# Patient Record
Sex: MALE | Race: WHITE | Employment: FULL TIME | URBAN - METROPOLITAN AREA
[De-identification: names, ages, dates, MRNs, and addresses within clinical notes are randomized per-mention and may not be internally consistent; named-entity substitution may affect disease eponyms.]

---

## 2019-02-25 ENCOUNTER — OFFICE VISIT (OUTPATIENT)
Dept: ORTHOPEDIC SURGERY | Age: 39
End: 2019-02-25
Payer: COMMERCIAL

## 2019-02-25 VITALS
RESPIRATION RATE: 12 BRPM | SYSTOLIC BLOOD PRESSURE: 128 MMHG | DIASTOLIC BLOOD PRESSURE: 72 MMHG | HEIGHT: 72 IN | WEIGHT: 204 LBS | BODY MASS INDEX: 27.63 KG/M2

## 2019-02-25 DIAGNOSIS — S93.491A HIGH ANKLE SPRAIN OF RIGHT LOWER EXTREMITY, INITIAL ENCOUNTER: Primary | ICD-10-CM

## 2019-02-25 PROCEDURE — 99203 OFFICE O/P NEW LOW 30 MIN: CPT | Performed by: ORTHOPAEDIC SURGERY

## 2019-02-25 PROCEDURE — L4361 PNEUMA/VAC WALK BOOT PRE OTS: HCPCS | Performed by: ORTHOPAEDIC SURGERY

## 2019-02-25 RX ORDER — NAPROXEN 500 MG/1
500 TABLET ORAL 2 TIMES DAILY WITH MEALS
Qty: 60 TABLET | Refills: 2 | Status: SHIPPED | OUTPATIENT
Start: 2019-02-25 | End: 2022-02-28

## 2019-02-25 ASSESSMENT — ENCOUNTER SYMPTOMS
ALLERGIC/IMMUNOLOGIC NEGATIVE: 1
GASTROINTESTINAL NEGATIVE: 1
EYES NEGATIVE: 1
RESPIRATORY NEGATIVE: 1

## 2019-02-27 ENCOUNTER — TELEPHONE (OUTPATIENT)
Dept: ORTHOPEDIC SURGERY | Age: 39
End: 2019-02-27

## 2019-03-04 ENCOUNTER — HOSPITAL ENCOUNTER (OUTPATIENT)
Dept: PHYSICAL THERAPY | Age: 39
Setting detail: THERAPIES SERIES
Discharge: HOME | End: 2019-03-04
Payer: COMMERCIAL

## 2019-03-04 PROCEDURE — 97110 THERAPEUTIC EXERCISES: CPT | Performed by: PHYSICAL THERAPIST

## 2019-03-04 PROCEDURE — 97161 PT EVAL LOW COMPLEX 20 MIN: CPT | Performed by: PHYSICAL THERAPIST

## 2019-03-04 PROCEDURE — G8979 MOBILITY GOAL STATUS: HCPCS | Performed by: PHYSICAL THERAPIST

## 2019-03-04 PROCEDURE — G8978 MOBILITY CURRENT STATUS: HCPCS | Performed by: PHYSICAL THERAPIST

## 2019-03-04 PROCEDURE — 97140 MANUAL THERAPY 1/> REGIONS: CPT | Performed by: PHYSICAL THERAPIST

## 2019-03-11 ENCOUNTER — HOSPITAL ENCOUNTER (OUTPATIENT)
Dept: PHYSICAL THERAPY | Age: 39
Setting detail: THERAPIES SERIES
Discharge: HOME | End: 2019-03-11
Payer: COMMERCIAL

## 2019-03-11 ENCOUNTER — OFFICE VISIT (OUTPATIENT)
Dept: ORTHOPEDIC SURGERY | Age: 39
End: 2019-03-11
Payer: COMMERCIAL

## 2019-03-11 VITALS
HEIGHT: 72 IN | RESPIRATION RATE: 12 BRPM | BODY MASS INDEX: 27.63 KG/M2 | SYSTOLIC BLOOD PRESSURE: 117 MMHG | HEART RATE: 65 BPM | WEIGHT: 204 LBS | DIASTOLIC BLOOD PRESSURE: 69 MMHG

## 2019-03-11 DIAGNOSIS — S93.491D HIGH ANKLE SPRAIN OF RIGHT LOWER EXTREMITY, SUBSEQUENT ENCOUNTER: Primary | ICD-10-CM

## 2019-03-11 PROCEDURE — 97112 NEUROMUSCULAR REEDUCATION: CPT | Performed by: PHYSICAL THERAPIST

## 2019-03-11 PROCEDURE — 97110 THERAPEUTIC EXERCISES: CPT | Performed by: PHYSICAL THERAPIST

## 2019-03-11 PROCEDURE — 99212 OFFICE O/P EST SF 10 MIN: CPT | Performed by: ORTHOPAEDIC SURGERY

## 2019-03-11 PROCEDURE — 97530 THERAPEUTIC ACTIVITIES: CPT | Performed by: PHYSICAL THERAPIST

## 2019-03-19 ENCOUNTER — HOSPITAL ENCOUNTER (OUTPATIENT)
Dept: PHYSICAL THERAPY | Age: 39
Setting detail: THERAPIES SERIES
Discharge: HOME | End: 2019-03-19
Payer: COMMERCIAL

## 2019-03-19 PROCEDURE — 97110 THERAPEUTIC EXERCISES: CPT | Performed by: PHYSICAL THERAPIST

## 2019-03-19 PROCEDURE — 97112 NEUROMUSCULAR REEDUCATION: CPT | Performed by: PHYSICAL THERAPIST

## 2019-03-26 ENCOUNTER — HOSPITAL ENCOUNTER (OUTPATIENT)
Dept: PHYSICAL THERAPY | Age: 39
Setting detail: THERAPIES SERIES
Discharge: HOME | End: 2019-03-26
Payer: COMMERCIAL

## 2019-03-26 PROCEDURE — 97110 THERAPEUTIC EXERCISES: CPT | Performed by: PHYSICAL THERAPIST

## 2019-03-26 PROCEDURE — 97112 NEUROMUSCULAR REEDUCATION: CPT | Performed by: PHYSICAL THERAPIST

## 2022-02-28 ENCOUNTER — OFFICE VISIT (OUTPATIENT)
Dept: ORTHOPEDIC SURGERY | Age: 42
End: 2022-02-28
Payer: COMMERCIAL

## 2022-02-28 VITALS — HEIGHT: 71 IN | RESPIRATION RATE: 12 BRPM | WEIGHT: 207 LBS | BODY MASS INDEX: 28.98 KG/M2

## 2022-02-28 DIAGNOSIS — M25.512 CHRONIC LEFT SHOULDER PAIN: Primary | ICD-10-CM

## 2022-02-28 DIAGNOSIS — M75.22 BICEPS TENDINITIS OF LEFT SHOULDER: ICD-10-CM

## 2022-02-28 DIAGNOSIS — G89.29 CHRONIC LEFT SHOULDER PAIN: Primary | ICD-10-CM

## 2022-02-28 PROCEDURE — 99214 OFFICE O/P EST MOD 30 MIN: CPT | Performed by: ORTHOPAEDIC SURGERY

## 2022-02-28 PROCEDURE — 20610 DRAIN/INJ JOINT/BURSA W/O US: CPT | Performed by: ORTHOPAEDIC SURGERY

## 2022-02-28 RX ORDER — OLMESARTAN MEDOXOMIL 20 MG/1
TABLET ORAL
COMMUNITY
Start: 2022-02-05

## 2022-02-28 RX ORDER — METHYLPREDNISOLONE ACETATE 40 MG/ML
80 INJECTION, SUSPENSION INTRA-ARTICULAR; INTRALESIONAL; INTRAMUSCULAR; SOFT TISSUE ONCE
Status: COMPLETED | OUTPATIENT
Start: 2022-02-28 | End: 2022-02-28

## 2022-02-28 RX ORDER — LIDOCAINE HYDROCHLORIDE 10 MG/ML
4 INJECTION, SOLUTION INFILTRATION; PERINEURAL ONCE
Status: COMPLETED | OUTPATIENT
Start: 2022-02-28 | End: 2022-02-28

## 2022-02-28 RX ORDER — HYDROCHLOROTHIAZIDE 12.5 MG/1
CAPSULE, GELATIN COATED ORAL
COMMUNITY
Start: 2022-02-05

## 2022-02-28 RX ADMIN — METHYLPREDNISOLONE ACETATE 80 MG: 40 INJECTION, SUSPENSION INTRA-ARTICULAR; INTRALESIONAL; INTRAMUSCULAR; SOFT TISSUE at 09:40

## 2022-02-28 RX ADMIN — LIDOCAINE HYDROCHLORIDE 4 ML: 10 INJECTION, SOLUTION INFILTRATION; PERINEURAL at 09:40

## 2022-02-28 NOTE — PROGRESS NOTES
Pili Flaherty is seen today for chronic left shoulder pain. He first remembers injuring his shoulder playing an alumni softball game about 8 years ago. He now notices discomfort in the anterior shoulder typically associated with softball and golf. Pain is about 4 out of 10. He has pain with raising his arm, getting dressed, and using it. He uses intermittent ibuprofen or ice but has not had consistent treatment. He is hoping to have some resolution before he plays softball again this spring. He is a banker. He has high cholesterol and high blood pressure but is otherwise healthy. History: Patient's relevant past family, medical, and social history are reviewed as part of today's visit. ROS of pertinent positives and negatives as above; otherwise negative. General Exam:    Vitals: Resp. rate 12, height 5' 11\" (1.803 m), weight 207 lb (93.9 kg). Constitutional: Patient is adequately groomed with no evidence of malnutrition  Mental Status: The patient is oriented to time, place and person. The patient's mood and affect are appropriate. Gait:  Patient walks with normal gait and station. Lymphatic: The lymphatic examination bilaterally reveals all areas to be without enlargement or induration. Vascular: Examination reveals no swelling or calf tenderness. Peripheral pulses are palpable and 2+. Neurological: The patient has good coordination. There is no weakness or sensory deficit. Skin:    Head/Neck: inspection reveals no rashes, ulcerations or lesions. Trunk:  inspection reveals no rashes, ulcerations or lesions. Right Lower Extremity: inspection reveals no rashes, ulcerations or lesions. Left Lower Extremity: inspection reveals no rashes, ulcerations or lesions. Examination of the cervical spine reveals no restriction in motion. There are no reproduction of symptoms into either arm with flexion, extension, rotation or palpation.   The patient has a negative Spurling sign, and no tenderness. Examination of the right shoulder reveals normal scapular control and no prominence. There is no pain over the acromioclavicular or sternoclavicular joints. The patient has no biceps pain. There is full range of motion. There is no pain with impingement testing. There is no pain with Rao maneuver. Coke's maneuver is normal.  There is no pain or apprehension in the abducted externally rotated position. There is no sulcus sign. There is no instability with anterior or posterior stress applied. The patient demonstrates full strength in the supraspinatus, infraspinatus, and subscapularis. Neurologic and vascular examination of the upper extremity  is normal.    Left shoulder has tenderness anteriorly. He has significant pain with forward flexion and supination but not with forward flexion pronation. He has pain with Rao and impingement maneuvers full strength in the cuff. Neurologic and vascular exam to the left upper extremity are normal and I cannot elicit instability. He has moderate to significant pain with Coke's maneuver. Xrays of the left shoulder were obtained today in the office and interpreted by me. AP in the scapular plane, axillary lateral, and scapular Y. These demonstrate:  No bony abnormalities. Assessment: Left shoulder bicep tendinitis with possible SLAP tear. Plan: Today we will proceed with intra-articular cortisone injection followed by therapy with follow-up in 3 weeks. We discussed the risk, benefits, alternatives to the injection. He understands there is a small risk of bicep tendon rupture. He agrees with this plan. Follow-up with me in 3 weeks. Procedure: Under sterile technique, left shoulder is injected anteriorly into the glenohumeral space with 80 mg of Depo-Medrol and 40 mg of lidocaine. He tolerated that well.

## 2022-03-07 ENCOUNTER — HOSPITAL ENCOUNTER (OUTPATIENT)
Dept: PHYSICAL THERAPY | Age: 42
Setting detail: THERAPIES SERIES
Discharge: HOME | End: 2022-03-07
Payer: COMMERCIAL

## 2022-03-07 PROCEDURE — 97112 NEUROMUSCULAR REEDUCATION: CPT | Performed by: PHYSICAL THERAPIST

## 2022-03-07 PROCEDURE — 97161 PT EVAL LOW COMPLEX 20 MIN: CPT | Performed by: PHYSICAL THERAPIST

## 2022-03-07 PROCEDURE — 97110 THERAPEUTIC EXERCISES: CPT | Performed by: PHYSICAL THERAPIST

## 2022-03-07 NOTE — FLOWSHEET NOTE
[address] [place] [address] [facility], [place]                                                         Physical Therapy Daily Treatment Note  Date:  3/7/2022    Patient Name:  Cruz Torre    :  1980  MRN: [id number]    Medical/Treatment Diagnosis Information:  · Diagnosis: M75.22 (ICD-10-CM) - Biceps tendinitis of left shoulder  · Treatment Diagnosis: M25.511 (R) shoulder pain; M62.81 muscle weakness  Insurance/Certification information:  PT Insurance Information: [employer]- Darra Poot thru [employer]  Physician Information:  Referring Practitioner: Fannie Pa MD  Has the plan of care been signed (Y/N):        []  Yes  [x]  No     Date of Patient follow up with Physician: 3/21/22      Is this a Progress Report:     []  Yes  [x]  No        If Yes:  Date Range for reporting period:  Beginning- 3/7/2022   Ending    Progress report will be due (10 Rx or 30 days whichever is less): 10 visits or 29       Recertification will be due (POC Duration  / 90 days whichever is less): as above         Visit # Insurance Allowable Auth Required   1 [place], auth thru [employer] [x]  Yes []  No        Functional Scale: UEFI - 85%    Date assessed:  3/7/2022     Latex Allergy:  [x]NO      []YES  Preferred Language for Healthcare:   [x]English       []other:      Pain level:  1/10  on 3/7/2022    SUBJECTIVE:  See eval    OBJECTIVE: See eval   Observation:    Test measurements:      ROM PROM AROM  Comment     L R L R     Flexion     162 162     Abduction     180 172     ER at side     85 pain 80     ER at 90 abd     90 96     BB IR     T8 mild pain anterior shoulder T10     IR at 90 abd     58 pain 51     Other             Other                    Strength L R Comment   Flexion 4+ 4+     Abduction 4 mild pain 4+ mild pain     ER 4 4+     IR 5 5     Supraspinatus         Upper Trap         Lower Trap 4- mild pain 4 mild pain     Mid Trap 4 4+     Rhomboids       Biceps 5 5     Triceps 5 5     Horizontal Abduction         Horizontal Adduction         Lats 4+ 4+        Orthopedic Special Tests:   Special Tests Left Right   Apley Scratch IR:  ER:   Cross body: IR:  ER:  Cross Body:   Neer's       Full Can       Empty Can       Abdirashid Whitfield       Nerve Tension Testing       Speed's Mild pain Mild pain   Friedman's        Spurling's       Repeated Scaption       Drop Arm (supraspinatus)       ER lag sign (infraspinatus)       Belly Press (subscapularis       Lift off (subscapularis)       Apprehension test       ER internal impingement test                                        Reflexes/Sensation (myotomes/dermatomes):      Joint mobility: n/t              []?Normal                       []?Hypo              []?Hyper     Palpation: n/t     Functional Mobility/Transfers: Indep     Posture:  WNL     Bandages/Dressings/Incisions: n/a     Gait: (include devices/WB status) Indep    RESTRICTIONS/PRECAUTIONS: HTN- controlled with meds, HLD (per MD note)    Exercises/Interventions:     Exercise/Equipment Resistance/Repetitions Other comments   Aerobic Conditioning     Aerodyne          Stretching/PROM     ER stretch supine     ER stretch seated     Table Slides     Wall slides      UE Reagan     Pulleys     Pendulum     BB IR     SL IR 10 x 10 secs    Pec doorway stretch At higher angle 4 x 20 secs to feel in pec but avoid pain in bicep    CBA stretch     UT stretch     LS stretch     Isometrics     Retraction HEP for throughout day at work         Weight shift     Flexion     Abduction     External Rotation     Internal Rotation     Biceps     Triceps          PRE's     Scaption     Abduction     External Rotation- SL 3 x 10 2 lbs    Internal Rotation     Shrugs     EXT- prone Hold 3 secs, 1 x 10     Reverse Flys- prone Hold 3 secs 1 x 10     Y's - prone     Prone ER at 90 abd     Serratus     Horizontal Abd with ER     Biceps     Triceps     Retraction     External rotation at 90 abd (hitch hiker) 3 x 10 3 lbs    Cable Column/Theraband     External Rotation     Internal Rotation     Shrugs     Lats     Ext     Flex     Scapular Retraction 2 x 10, black TB, hold 3 secs    BIC     TRIC     PNF          Dynamic Stability     Ball on wall     Push ups on wall     Push ups on ball on wall     90/90 ball taps     Body blade     Horizontal abd ball taps     Plyoback            Patient education: Pt was educated on PT diagnosis, prognosis, and plan of care. Pt was educated on importance of correcting his/her posture throughout the day    Reviewed insurance benefits for physical therapy in an outpatient hospital based setting with the patient, including deductible of met and allowable visit number. Pt was informed of possible out of pocket costs. Therapeutic Exercise and NMR EXR  [x] (88903) Provided verbal/tactile cueing for activities related to strengthening, flexibility, endurance, ROM  for improvements in scapular, scapulothoracic and UE control with self care, reaching, carrying, lifting, house/yardwork, driving/computer work.    [] (82313) Provided verbal/tactile cueing for activities related to improving balance, coordination, kinesthetic sense, posture, motor skill, proprioception  to assist with  scapular, scapulothoracic and UE control with self care, reaching, carrying, lifting, house/yardwork, driving/computer work. Therapeutic Activities:    [x] (10459 or 70020) Provided verbal/tactile cueing for activities related to improving balance, coordination, kinesthetic sense, posture, motor skill, proprioception and motor activation to allow for proper function of scapular, scapulothoracic and UE control with self care, carrying, lifting, driving/computer work.      Home Exercise Program:    [x] (48355) Reviewed/Progressed HEP activities related to strengthening, flexibility, endurance, ROM of scapular, scapulothoracic and UE control with self care, reaching, carrying, lifting, house/yardwork, driving/computer work  [] (04140) Reviewed/Progressed HEP activities related to improving balance, coordination, kinesthetic sense, posture, motor skill, proprioception of scapular, scapulothoracic and UE control with self care, reaching, carrying, lifting, house/yardwork, driving/computer work    Access Code: [email or web address]  URL: [email or web address]  Date: 03/07/2022  Prepared by: Belle Lawson    Exercises  Sleeper Stretch - 2 x daily - 7 x weekly - 10 reps - 1 sets - 10 hold  Doorway Pec Stretch at 90 Degrees Abduction - 2 x daily - 7 x weekly - 3 sets - 1 reps - 30 hold  Sidelying Shoulder External Rotation - 1 x daily - 7 x weekly - 10 reps - 3 sets  Seated Scapular Retraction - 2 x daily - 7 x weekly - 10 reps - 1 sets - 10 hold  Prone Shoulder Extension - 1 x daily - 7 x weekly - 2-3 sets - 10 reps - 3-5 hold  Prone Middle Trapezius with Legs Straight on Swiss Ball - 1 x daily - 7 x weekly - 2-3 sets - 10 reps - 3-5 hold  Scapular Retraction with Resistance - 1 x daily - 7 x weekly - 3 sets - 10 reps - 3 hold  Seated Shoulder External Rotation in Abduction Supported with Dumbbell - 1 x daily - 7 x weekly - 3 sets - 10 reps    Manual Treatments:  PROM / STM / Oscillations-Mobs:  G-I, II, III, IV (PA's, Inf., Post.)  [] (53084) Provided manual therapy to mobilize soft tissue/joints of cervical/CT, scapular GHJ and UE for the purpose of modulating pain, promoting relaxation,  increasing ROM, reducing/eliminating soft tissue swelling/inflammation/restriction, improving soft tissue extensibility and allowing for proper ROM for normal function with self care, reaching, carrying, lifting, house/yardwork, driving/computer work    Modalities:      Charges:  Timed Code Treatment Minutes: 23   Total Treatment Minutes: 45     [x] EVAL (LOW) 77540   [] EVAL (MOD) 01580   [] EVAL (HIGH) 39700   [] RE-EVAL   [x] SS(21707) x   1  [] IONTO  [x] NMR (28800) x 1    [] VASO  [] Manual (80224) x      [] Other:  [] TA x      [] [facility] Traction (61487)  [] ES(attended) (92111)      [] ES (un) (66277):       Annamaria Joseph stated goal: get back to playing softball this spring, strengthen muscles, not need surgery     Therapist goals for Patient:   Short Term Goals: To be achieved in: 2 weeks  1. Independent in HEP and progression per patient tolerance, in order to prevent re-injury. []? Progressing: []? Met: []? Not Met: []? Adjusted   2. Patient will have a decrease in pain to facilitate improvement in movement, function, and ADLs as indicated by Functional Deficits. []? Progressing: []? Met: []? Not Met: []? Adjusted     Long Term Goals: To be achieved in: 4-6 weeks  1. Disability index score of 95% or more for the UEFI to assist with reaching prior level of function. []? Progressing: []? Met: []? Not Met: []? Adjusted  2. Patient will demonstrate increased L shoulder AROM to 60 IR at 90 abd to allow for proper joint functioning as indicated by patients Functional Deficits. []? Progressing: []? Met: []? Not Met: []? Adjusted  3. Patient will demonstrate an increase in L shoulder strength to 4+/5 abd and ER in UE to allow for proper functional mobility as indicated by patients Functional Deficits. []? Progressing: []? Met: []? Not Met: []? Adjusted  4. Patient will return to getting dressed donning/doffing shirts without increased symptoms or restriction. []? Progressing: []? Met: []? Not Met: []? Adjusted  5. Pt will return to reaching overhead for ADLs including reaching into cabinets and closets with lift weight without increased pain in L shoulder. []? Progressing: []? Met: []? Not Met: []? Adjusted         Overall Progression Towards Functional goals/ Treatment Progress Update:  [] Patient is progressing as expected towards functional goals listed. [] Progression is slowed due to complexities/Impairments listed. [] Progression has been slowed due to co-morbidities.   [x] Plan just implemented, too soon to assess goals progression <30days   [] Goals require adjustment due to lack of progress  [] Patient is not progressing as expected and requires additional follow up with physician  [] Other    Prognosis for POC: [x] Good [] Fair  [] Poor      Patient requires continued skilled intervention: [x] Yes  [] No      ASSESSMENT:  See eval    Treatment/Activity Tolerance:  [x] Patient tolerated treatment well [] Patient limited by fatique  [] Patient limited by pain  [] Patient limited by other medical complications  [] Other:       Return to Play: (if applicable)   []  Stage 1: Intro to Strength   []  Stage 2: Return to Run and Strength   []  Stage 3: Return to Jump and Strength   []  Stage 4: Dynamic Strength and Agility   []  Stage 5: Sport Specific Training     []  Ready to Return to Play, Meets All Above Stages   []  Not Ready for Return to Sports   Comments:            Prognosis: [x] Good [] Fair  [] Poor    Patient Requires Follow-up: [x] Yes  [] No    PLAN: See eval; consider (throwers 10) TB ER, ER at 80 with band, IR at 90, D2 flex/extn, prone Ys, consider ER at 90 abd prone, eccentric bicep strengthening  [] Continue per plan of care [] Alter current plan (see comments above)  [x] Plan of care initiated [] Hold pending MD visit [] Discharge    Note: If patient does not return for scheduled/ recommended follow up visits, this note will serve as a discharge from care along with most recent update on progress.      Electronically signed by: Alfredo Pratt, PT PT, DPT

## 2022-03-07 NOTE — PLAN OF CARE
[address], [address] [place] [address], [address] [place] [address]  Phone: [phone]   Fax: [phone]          Physical Therapy Certification    Dear Referring Practitioner: Wanda Méndez MD,    We had the pleasure of evaluating the following patient for physical therapy services at [address] [place] [address]. A summary of our findings can be found in the initial assessment below. This includes our plan of care. If you have any questions or concerns regarding these findings, please do not hesitate to contact me at the office phone number checked above. Thank you for the referral.       Physician Signature:_______________________________Date:__________________  By signing above (or electronic signature), therapists plan is approved by physician      Patient: Fannie Hanson   : 1980   MRN: [id number]  Referring Physician: Referring Practitioner: Wanda Méndez MD      Evaluation Date: 3/7/2022      Medical Diagnosis Information:  Diagnosis: M75.22 (ICD-10-CM) - Biceps tendinitis of left shoulder   Treatment Diagnosis: M25.511 (R) shoulder pain; M62.81 muscle weakness                                           Precautions/ Contra-indications: HTN- controlled with meds, HLD    C-SSRS Triggered by Intake questionnaire (Past 2 wk assessment):   [x] No, Questionnaire did not trigger screening.   [] Yes, Patient intake triggered further evaluation      [] C-SSRS Screening completed  [] PCP notified via Plan of Care  [] Emergency services notified     Latex Allergy:  [x]NO      []YES  Preferred Language for Healthcare:   [x]English       []other:    SUBJECTIVE: Patient stated complaint: Pt presents with L shoulder pain. Pain is present with playing softball and golf and activities as below. R-handed. He remember injuring his shoulder playing an alumni softball game 5-7 years ago. His shoulder has been bothering him on/off since then.  He wants to get back to playing softball this spring. Pt was given a cortisone injection at MD appt and told to start PT. Injection did seem to help first few days but a little more sore today but still better than it was. Relevant Medical History:HTN- controlled with meds, HLD (per MD note)  Functional Disability Index: UEFI - 85%  Relevant Medication:   Not since injection. Was taking ibuprofen intermittently. Current pain:  1/10 dull ache  Pain at worst: 6-7/10 Before injection  Pain at best:  1/10    Easing factors: meds, cortisone injection, ice at times. Provocative factors: playing softball, golf, raising arm, lifting arm in flex with arm in supination, driving turning hand on top of wheel, getting dressed, using L UE.      Type: []Constant   [x]Intermittent  []Radiating [x]Localized []other:     Numbness/Tingling:none    Functional Limitations/Impairments: [x]Lifting/reaching [x]Grooming []Carrying    []ADL's [x]Driving [x]Sports/Recreations   []Other:    Occupation/School: [employer]    Living Status/Prior Level of Function: Independent with ADLs and IADLs, playing softball and golf  recreationally    OBJECTIVE:     CERV ROM     Cervical Flexion     Cervical Extension     Cervical SB     Cervical rotation         ROM PROM AROM  Comment    L R L R    Flexion   162 162    Abduction   180 172    ER at side   85 pain 80    ER at 90 abd   90 96    BB IR   T8 mild pain anterior shoulder T10    IR at 90 abd   58 pain 51    Other        Other             Strength L R Comment   Flexion 4+ 4+    Abduction 4 mild pain 4+ mild pain    ER 4 4+    IR 5 5    Supraspinatus      Upper Trap      Lower Trap 4- mild pain 4 mild pain    Mid Trap 4 4+    Rhomboids      Biceps 5 5    Triceps 5 5    Horizontal Abduction      Horizontal Adduction      Lats 4+ 4+      Orthopedic Special Tests:   Special Tests Left Right   Apley Scratch IR:  ER:   Cross body: IR:  ER:  Cross Body:   Neer's     Full Can     Empty Can Abdirashid Whitfield     Nerve Tension Testing     Speed's Mild pain Mild pain   Friedman's      Spurling's     Repeated Scaption     Drop Arm (supraspinatus)     ER lag sign (infraspinatus)     Belly Press (subscapularis     Lift off (subscapularis)     Apprehension test     ER internal impingement test                          Reflexes/Sensation (myotomes/dermatomes):     Joint mobility: n/t   []Normal    []Hypo   []Hyper    Palpation: n/t    Functional Mobility/Transfers: Indep    Posture: WNL    Bandages/Dressings/Incisions: n/a    Gait: (include devices/WB status) Indep              Review Of Systems (ROS):  [x]Performed Review of systems (Integumentary, CardioPulmonary, Neurological) by intake and observation. Intake form has been scanned into medical record. Patient has been instructed to contact their primary care physician regarding ROS issues if not already being addressed at this time.       Co-morbidities/Complexities (which will affect course of rehabilitation):   []None           Arthritic conditions   []Rheumatoid arthritis (M05.9)  []Osteoarthritis (M19.91)   Cardiovascular conditions   [x]Hypertension (I10)  [x]Hyperlipidemia (E78.5)  []Angina pectoris (I20)  []Atherosclerosis (I70)   Musculoskeletal conditions   []Disc pathology   []Congenital spine pathologies   []Prior surgical intervention  []Osteoporosis (M81.8)  []Osteopenia (M85.8)   Endocrine conditions   []Hypothyroid (E03.9)  []Hyperthyroid Gastrointestinal conditions   []Constipation (Z04.03)   Metabolic conditions   []Morbid obesity (E66.01)  []Diabetes type 1(E10.65) or 2 (E11.65)   []Neuropathy (G60.9)     Pulmonary conditions   []Asthma (J45)  []Coughing   []COPD (J44.9)   Psychological Disorders  []Anxiety (F41.9)  []Depression (F32.9)   []Other:   []Other:          Barriers to/and or personal factors that will affect rehab potential:              []Age  []Sex              []Motivation/Lack of Motivation tolerate impact through UE   [x]Reduced ability to reach behind back   []Reduced ability to  or hold objects   [x]Reduced ability to throw or toss an object   []other:    Participation Restrictions   [x]Reduced participation in self care activities   []Reduced participation in home management activities   []Reduced participation in work activities   []Reduced participation in social activities. [x]Reduced participation in sport/recreation activities. Classification:   []Signs/symptoms consistent with post-surgical status including decreased ROM, strength and function. []Signs/symptoms consistent with joint sprain/strain   []Signs/symptoms consistent with shoulder impingement   []Signs/symptoms consistent with shoulder/elbow/wrist tendinopathy   []Signs/symptoms consistent with Rotator cuff tear   [x]Signs/symptoms consistent with labral tear vs bicep tendonitis   []Signs/symptoms consistent with postural dysfunction    []Signs/symptoms consistent with Glenohumeral IR Deficit - <45 degrees   []Signs/symptoms consistent with facet dysfunction of cervical/thoracic spine    []Signs/symptoms consistent with pathology which may benefit from Dry needling     []other:     Prognosis/Rehab Potential:      []Excellent   [x]Good    []Fair   []Poor    Tolerance of evaluation/treatment:    []Excellent   [x]Good    []Fair   []Poor    PLAN:  Frequency/Duration:  1 days per week for 4-6 Weeks:  INTERVENTIONS:  [x] Therapeutic exercise including: strength training, ROM, for Upper extremity and core   [x]  NMR activation and proprioception for UE, scap and Core   [x] Manual therapy as indicated for shoulder, scapula and spine to include: Dry Needling/IASTM, STM, PROM, Gr I-IV mobilizations, manipulation.    [x] Modalities as needed that may include: thermal agents, E-stim, Biofeedback, US, iontophoresis as indicated  [x] Patient education on joint protection, postural re-education, activity modification, progression of HEP.    HEP instruction: Pt was instructed in, and safely and correctly demonstrated home exercise program. Patient verbalized understanding of proper frequency of exercises. Copy of exercises was scanned into patient chart and can be fount in the media file. GOALS:  Patient stated goal: get back to playing softball this spring, strengthen muscles, not need surgery    Therapist goals for Patient:   Short Term Goals: To be achieved in: 2 weeks  1. Independent in HEP and progression per patient tolerance, in order to prevent re-injury. [] Progressing: [] Met: [] Not Met: [] Adjusted   2. Patient will have a decrease in pain to facilitate improvement in movement, function, and ADLs as indicated by Functional Deficits. [] Progressing: [] Met: [] Not Met: [] Adjusted    Long Term Goals: To be achieved in: 4-6 weeks  1. Disability index score of 95% or more for the UEFI to assist with reaching prior level of function. [] Progressing: [] Met: [] Not Met: [] Adjusted  2. Patient will demonstrate increased L shoulder AROM to 60 IR at 90 abd to allow for proper joint functioning as indicated by patients Functional Deficits. [] Progressing: [] Met: [] Not Met: [] Adjusted  3. Patient will demonstrate an increase in L shoulder strength to 4+/5 abd and ER in UE to allow for proper functional mobility as indicated by patients Functional Deficits. [] Progressing: [] Met: [] Not Met: [] Adjusted  4. Patient will return to getting dressed donning/doffing shirts without increased symptoms or restriction. [] Progressing: [] Met: [] Not Met: [] Adjusted  5. Pt will return to reaching overhead for ADLs including reaching into cabinets and closets with lift weight without increased pain in L shoulder.   [] Progressing: [] Met: [] Not Met: [] Adjusted          Physical Therapy Evaluation Complexity Justification  [x] A history of present problem with:  [] no personal factors and/or comorbidities that impact the plan of

## 2022-03-15 ENCOUNTER — HOSPITAL ENCOUNTER (OUTPATIENT)
Dept: PHYSICAL THERAPY | Age: 42
Setting detail: THERAPIES SERIES
Discharge: HOME | End: 2022-03-15
Payer: COMMERCIAL

## 2022-03-15 PROCEDURE — 97110 THERAPEUTIC EXERCISES: CPT | Performed by: PHYSICAL THERAPIST

## 2022-03-15 PROCEDURE — 97112 NEUROMUSCULAR REEDUCATION: CPT | Performed by: PHYSICAL THERAPIST

## 2022-03-15 NOTE — FLOWSHEET NOTE
[facility], [place]                                                         Physical Therapy Daily Treatment Note  Date:  3/15/2022    Patient Name:  Simeon Saldaña    :  1980  MRN: [id number]    Medical/Treatment Diagnosis Information:  · Diagnosis: M75.22 (ICD-10-CM) - Biceps tendinitis of left shoulder  · Treatment Diagnosis: M25.511 (R) shoulder pain; M62.81 muscle weakness  Insurance/Certification information:  PT Insurance Information: [employer]- [place], [place] thru [employer]  Physician Information:  Referring Practitioner: Rayne Fritz MD  Has the plan of care been signed (Y/N):        [x]  Yes  []  No     Date of Patient follow up with Physician: 3/21/22      Is this a Progress Report:     []  Yes  [x]  No        If Yes:  Date Range for reporting period:  Beginning- 3/7/2022   Ending    Progress report will be due (10 Rx or 30 days whichever is less): 10 visits or 3/6/21       Recertification will be due (POC Duration  / 90 days whichever is less): as above         Visit # Insurance Allowable Auth Required   2 6 visits auth through 22; [place], [x]  Yes []  No        Functional Scale: UEFI - 85%    Date assessed:  3/7/2022     Latex Allergy:  [x]NO      []YES  Preferred Language for Healthcare:   [x]English       []other:      Pain level:  /10  on 3/15/2022    SUBJECTIVE:  Pt was sore for a couple days like 3/10. He does feel better today. He did not improvement after cortisone injection but not as good as he was hoping for. Pt still noted pain swinging golf club last Monday. He feels more sore after doing sleeper stretch; it is getting better though. Pec stretch got better. He feels good with strength exercises but did not do prone Ts because could not find where to do these.      OBJECTIVE: See eval   Observation:    Test measurements:              ROM PROM AROM  Comment     L R L R     Flexion     162 162   Abduction     180 172     ER at side     85 pain 80     ER at 90 abd     90 96     BB IR     T8 mild pain anterior shoulder T10     IR at 90 abd     58 pain 51     Other             Other                    Strength L R Comment   Flexion 4+ 4+     Abduction 4 mild pain 4+ mild pain     ER 4 4+     IR 5 5     Supraspinatus         Upper Trap         Lower Trap 4- mild pain 4 mild pain     Mid Trap 4 4+     Rhomboids         Biceps 5 5     Triceps 5 5     Horizontal Abduction         Horizontal Adduction         Lats 4+ 4+        Orthopedic Special Tests:   Special Tests Left Right   Apley Scratch IR:  ER:   Cross body: IR:  ER:  Cross Body:   Neer's       Full Can       Empty Can       Alesia Quevedo       Nerve Tension Testing       Speed's Mild pain Mild pain   Friedman's        Spurling's       Repeated Scaption       Drop Arm (supraspinatus)       ER lag sign (infraspinatus)       Belly Press (subscapularis       Lift off (subscapularis)       Apprehension test       ER internal impingement test                                        Reflexes/Sensation (myotomes/dermatomes):      Joint mobility: n/t              []?Normal                       []?Hypo              []?Hyper     Palpation: n/t     Functional Mobility/Transfers: Indep     Posture:  WNL     Bandages/Dressings/Incisions: n/a     Gait: (include devices/WB status) Indep    RESTRICTIONS/PRECAUTIONS: HTN- controlled with meds, HLD (per MD note)    Exercises/Interventions:     Exercise/Equipment Resistance/Repetitions Other comments   Aerobic Conditioning     Aerodyne          Stretching/PROM     ER stretch supine     ER stretch seated     Table Slides     Wall slides      UE [place]     Pulleys     Pendulum     BB IR 1 x 10 secs, (10 in HEP)    SL IR 10 x 10 secs- educated to roll back instead to avoid pain at [facility] joint    Pec doorway stretch At higher angle 4 x 20 secs to feel in pec but avoid pain in bicep    CBA stretch     UT stretch     LS stretch Isometrics     Retraction HEP for throughout day at work         Weight shift     Flexion     Abduction     External Rotation     Internal Rotation     Biceps     Triceps          PRE's     Scaption     Abduction     External Rotation- SL     Internal Rotation     Shrugs     EXT- prone     Reverse Flys- prone Hold 3 secs,  1 x 10 off table; 1 x 10 leaning over SB    Y's - prone Hold 3 secs , 1 x 10  off table; 1 x 10 leaning over SB    Prone ER at 90 abd 3 x 10 1 lb    Serratus     Horizontal Abd with ER     Biceps 3 x 10 ECL 5 secs 8 lbs    Triceps     Retraction     External rotation at 90 abd (hitch hiker) 3 x 10 2, 1, 1 lbs    Cable Column/Theraband     External Rotation At side 3 x 10 red TB  At 90 abd- attempted but painful anterior shoulder so held    Internal Rotation At 90 abd 2 x 10 black TB    Shrugs     Lats     Ext     Flex     Scapular Retraction 2 x 10, silver TB, hold 3 secs    BIC     TRIC     PNF D2 flex 2 x 10 green TB  D2 extn 2 x 10 black TB         Dynamic Stability     Ball on wall     Push ups on wall     Push ups on ball on wall     90/90 ball taps     Body blade     Horizontal abd ball taps     Plyoback            Patient education: Pt was educated on PT diagnosis, prognosis, and plan of care. Pt was educated on importance of correcting his/her posture throughout the day    Reviewed insurance benefits for physical therapy in an outpatient hospital based setting with the patient, including deductible of met and allowable visit number. Pt was informed of possible out of pocket costs.       Therapeutic Exercise and NMR EXR  [x] (60209) Provided verbal/tactile cueing for activities related to strengthening, flexibility, endurance, ROM  for improvements in scapular, scapulothoracic and UE control with self care, reaching, carrying, lifting, house/yardwork, driving/computer work.    [] (71920) Provided verbal/tactile cueing for activities related to improving balance, coordination, kinesthetic sense, posture, motor skill, proprioception  to assist with  scapular, scapulothoracic and UE control with self care, reaching, carrying, lifting, house/yardwork, driving/computer work. Therapeutic Activities:    [x] (31149 or 39534) Provided verbal/tactile cueing for activities related to improving balance, coordination, kinesthetic sense, posture, motor skill, proprioception and motor activation to allow for proper function of scapular, scapulothoracic and UE control with self care, carrying, lifting, driving/computer work. Home Exercise Program:    [x] (11373) Reviewed/Progressed HEP activities related to strengthening, flexibility, endurance, ROM of scapular, scapulothoracic and UE control with self care, reaching, carrying, lifting, house/yardwork, driving/computer work  [] (93477) Reviewed/Progressed HEP activities related to improving balance, coordination, kinesthetic sense, posture, motor skill, proprioception of scapular, scapulothoracic and UE control with self care, reaching, carrying, lifting, house/yardwork, driving/computer work    Access Code: [email or web address]  URL: [employer]/  Date: 03/15/2022  Prepared by: Praveen Sarmiento  Doorway Pec Stretch at 90 Degrees Abduction - 1 x daily - 7 x weekly - 1 reps - 3 sets - 30 hold  Standing Shoulder Internal Rotation Stretch with Towel - 2 x daily - 7 x weekly - 10 reps - 1 sets - 10 hold  Sleeper Stretch - 2 x daily - 7 x weekly - 10 reps - 1 sets - 10 hold  Standing Shoulder Single Arm PNF D2 Flexion with Anchored Resistance - 1 x daily - 7 x weekly - 10 reps - 3 sets  Standing Shoulder Single Arm PNF D2 Extension with Anchored Resistance - 1 x daily - 7 x weekly - 10 reps - 3 sets  Shoulder External Rotation with Anchored Resistance - 1 x daily - 7 x weekly - 10 reps - 3 sets  Sidelying Shoulder ER with Towel and Dumbbell - 1 x daily - 7 x weekly - 10 reps - 3 sets  Seated Shoulder External Rotation in Abduction Supported with Dumbbell - 1 x daily - 7 x weekly - 3 sets - 10 reps  Standing Single Arm Shoulder Internal Rotation in Abduction with Anchored Resistance - 1 x daily - 7 x weekly - 10 reps - 3 sets  Prone Shoulder Horizontal Abduction on Swiss Ball - 1 x daily - 7 x weekly - 10 reps - 3 sets  Prone Lower Trapezius with Legs Straight on Swiss Ball - 1 x daily - 7 x weekly - 10 reps - 3 sets  Standing Row with Resistance - 1 x daily - 7 x weekly - 10 reps - 3 sets  Prone Shoulder External Rotation - 1 x daily - 7 x weekly - 10 reps - 3 sets  Standing Bicep Curl with Dumbbells - 1 x daily - 7 x weekly - 10 reps - 3 sets      Manual Treatments:  PROM / STM / Oscillations-Mobs:  G-I, II, III, IV (PA's, Inf., Post.)  [] (65449) Provided manual therapy to mobilize soft tissue/joints of cervical/CT, scapular GHJ and UE for the purpose of modulating pain, promoting relaxation,  increasing ROM, reducing/eliminating soft tissue swelling/inflammation/restriction, improving soft tissue extensibility and allowing for proper ROM for normal function with self care, reaching, carrying, lifting, house/yardwork, driving/computer work    Modalities:      Charges:  Timed Code Treatment Minutes: 46   Total Treatment Minutes: 50     [] EVAL (LOW) 06799   [] EVAL (MOD) 22239   [] EVAL (HIGH) 64282   [] RE-EVAL   [x] VV(64930) x  2  [] IONTO  [x] NMR (43658) x 1    [] VASO  [] Manual (25183) x      [] Other:  [] TA x      [] Mech Traction (37821)  [] ES(attended) (94329)      [] ES (un) (91414):       Teodoro Waldron stated goal: get back to playing softball this spring, strengthen muscles, not need surgery     Therapist goals for Patient:   Short Term Goals: To be achieved in: 2 weeks  1. Independent in HEP and progression per patient tolerance, in order to prevent re-injury. []? Progressing: []? Met: []? Not Met: []? Adjusted   2.  Patient will have a decrease in pain to facilitate improvement in movement, function, and ADLs as indicated by Functional Deficits. []? Progressing: []? Met: []? Not Met: []? Adjusted     Long Term Goals: To be achieved in: 4-6 weeks  1. Disability index score of 95% or more for the UEFI to assist with reaching prior level of function. []? Progressing: []? Met: []? Not Met: []? Adjusted  2. Patient will demonstrate increased L shoulder AROM to 60 IR at 90 abd to allow for proper joint functioning as indicated by patients Functional Deficits. []? Progressing: []? Met: []? Not Met: []? Adjusted  3. Patient will demonstrate an increase in L shoulder strength to 4+/5 abd and ER in UE to allow for proper functional mobility as indicated by patients Functional Deficits. []? Progressing: []? Met: []? Not Met: []? Adjusted  4. Patient will return to getting dressed donning/doffing shirts without increased symptoms or restriction. []? Progressing: []? Met: []? Not Met: []? Adjusted  5. Pt will return to reaching overhead for ADLs including reaching into cabinets and closets with lift weight without increased pain in L shoulder. []? Progressing: []? Met: []? Not Met: []? Adjusted         Overall Progression Towards Functional goals/ Treatment Progress Update:  [] Patient is progressing as expected towards functional goals listed. [] Progression is slowed due to complexities/Impairments listed. [] Progression has been slowed due to co-morbidities.   [x] Plan just implemented, too soon to assess goals progression <30days   [] Goals require adjustment due to lack of progress  [] Patient is not progressing as expected and requires additional follow up with physician  [] Other    Prognosis for POC: [x] Good [] Fair  [] Poor      Patient requires continued skilled intervention: [x] Yes  [] No      ASSESSMENT:  See eval    Treatment/Activity Tolerance:  [x] Patient tolerated treatment well [] Patient limited by fatique  [] Patient limited by pain  [] Patient limited by other medical complications  [x] Other: Pt continues to have some pain anterior shoulder with ER of L shoulder when in abduction. He has to adjust position of arm on doorway stretch so he does not get pain. He had more pain with attempted ER at 90 abd with band so this was held but was able to do with arm resting on table with a weight. He was cued throughout exercises to make sure he corrects/improves scapular position first phu prone ER at 90. Pt was advanced to more exercises of Throwers 10 program and otherwise tolerated these well. He was educated on importance of strengthening only every other day. Return to Play: (if applicable)   []  Stage 1: Intro to Strength   []  Stage 2: Return to Run and Strength   []  Stage 3: Return to Jump and Strength   []  Stage 4: Dynamic Strength and Agility   []  Stage 5: Sport Specific Training     []  Ready to Return to Play, Meets All Above Stages   []  Not Ready for Return to Sports   Comments:            Prognosis: [x] Good [] Fair  [] Poor    Patient Requires Follow-up: [x] Yes  [] No    PLAN: See eval; consider scaption, 90/90 ball taps, Er upper cut iso vs serratus slides; fu with MD appt  [x] Continue per plan of care [] Alter current plan (see comments above)  [] Plan of care initiated [] Hold pending MD visit [] Discharge    Note: If patient does not return for scheduled/ recommended follow up visits, this note will serve as a discharge from care along with most recent update on progress.      Electronically signed by: Peter Calderon, PT PT, DPT

## 2022-03-21 ENCOUNTER — OFFICE VISIT (OUTPATIENT)
Dept: ORTHOPEDIC SURGERY | Age: 42
End: 2022-03-21
Payer: COMMERCIAL

## 2022-03-21 VITALS — BODY MASS INDEX: 28.98 KG/M2 | WEIGHT: 207 LBS | HEIGHT: 71 IN

## 2022-03-21 DIAGNOSIS — M25.512 CHRONIC LEFT SHOULDER PAIN: Primary | ICD-10-CM

## 2022-03-21 DIAGNOSIS — G89.29 CHRONIC LEFT SHOULDER PAIN: Primary | ICD-10-CM

## 2022-03-21 DIAGNOSIS — M75.22 BICEPS TENDINITIS OF LEFT SHOULDER: ICD-10-CM

## 2022-03-21 PROCEDURE — 99212 OFFICE O/P EST SF 10 MIN: CPT | Performed by: ORTHOPAEDIC SURGERY

## 2022-03-21 RX ORDER — OMEPRAZOLE 20 MG/1
CAPSULE, DELAYED RELEASE ORAL
COMMUNITY
Start: 2022-03-03

## 2022-03-21 NOTE — PROGRESS NOTES
Wale Hua returns today for his left shoulder. He is noticed about 80% relief. After 4 days the injection was very helpful. He is attended therapy and is trying to do the exercises on his own as well. He played golf last week without any difficulty. General Exam:    Vitals: Height 5' 11\" (1.803 m), weight 207 lb (93.9 kg). Constitutional: Patient is adequately groomed with no evidence of malnutrition  Mental Status: The patient is oriented to time, place and person. The patient's mood and affect are appropriate. Physical exam of the shoulder today is very benign. I cannot reproduce discomfort. He has full motion with good strength. Assessment: Improving symptoms from left shoulder bicep tendinitis and possibly SLAP tear. Plan: If he has recurrent symptoms I recommend an MRI. I encouraged him to continue with his exercise program.  Follow-up with me on an as-needed basis.